# Patient Record
Sex: FEMALE | Race: BLACK OR AFRICAN AMERICAN
[De-identification: names, ages, dates, MRNs, and addresses within clinical notes are randomized per-mention and may not be internally consistent; named-entity substitution may affect disease eponyms.]

---

## 2019-10-26 ENCOUNTER — HOSPITAL ENCOUNTER (EMERGENCY)
Dept: HOSPITAL 76 - ED | Age: 24
LOS: 1 days | Discharge: HOME | End: 2019-10-27
Payer: COMMERCIAL
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
## 2019-10-26 DIAGNOSIS — S61.211A: Primary | ICD-10-CM
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
## 2019-10-26 PROCEDURE — 12001 RPR S/N/AX/GEN/TRNK 2.5CM/<: CPT

## 2019-10-26 PROCEDURE — 99281 EMR DPT VST MAYX REQ PHY/QHP: CPT

## 2019-10-26 NOTE — ED PHYSICIAN DOCUMENTATION
PD HPI UPPER EXT INJURY





- Stated complaint


Stated Complaint: FINGER LAC





- Chief complaint


Chief Complaint: Laceration





- History obtained from


History obtained from: Patient





- History of Present Illness


Location: Left, Finger


Type of injury: Laceration


Where injury occurred: Home


Timing - onset: Enter  time (22:15), Today


Timing - details: Abrupt onset


Improved by: Rest


Worsened by: Moving, Palpating


Associated symptoms: No: Weakness, Numbness, Tingling, Swelling, Discolored


Similar symptoms before: Has not had sx before


Recently seen: Not recently seen





- Additonal information


Additional information: 





patient was trying to open a bottle using a knife; knife slipped and caused 

laceration to her left 2nd finger. patient is right hand dominant





Review of Systems


Skin: reports: Laceration (s)


Neurologic: denies: Focal weakness, Numbness





PD PAST MEDICAL HISTORY





- Past Medical History


Past Medical History: No





- Present Medications


Home Medications: 


                                Ambulatory Orders











 Medication  Instructions  Recorded  Confirmed


 


No Known Home Medications  10/26/19 10/26/19














- Allergies


Allergies/Adverse Reactions: 


                                    Allergies











Allergy/AdvReac Type Severity Reaction Status Date / Time


 


No Known Drug Allergies Allergy   Verified 10/26/19 23:03














- Living Situation


Living Arrangement: reports: At home





PD ED PE NORMAL





- Vitals


Vital signs reviewed: Yes





- General


General: Alert and oriented X 3, No acute distress, Well developed/nourished





- Derm


Derm: Normal color, Warm and dry





- Extremities


Extremities: Normal ROM s pain, No edema





- Neuro


Neuro: No motor deficit (FROM and strength, flexion (including with isolation of

PIP, DIP, and MCP joints) and extension), No sensory deficit





PD ED PE EXPANDED





- Extremities


Extremities: Sensory intact (LTS intact distal to laceration), Vascular intact 

(brisk capillary refill at fingertip (left second finger))


RANDOLPH UE/Hands Visual: 


                            __________________________














                            __________________________





 1 - laceration (1 cm)








Results





- Vitals


Vitals: 


                               Vital Signs - 24 hr











  10/26/19 10/27/19





  23:00 00:20


 


Temperature 36.8 C 36.7 C


 


Heart Rate 75 74


 


Respiratory 16 18





Rate  


 


Blood Pressure 151/90 H 126/56 L


 


O2 Saturation 100 99








                                     Oxygen











O2 Source                      Room air

















Procedures





- Laceration (location)


  ** Finger left Ventral


Length in cm: 1


Wound type: Linear


Neurovascular status: Sensory intact, Motor intact, Vascular intact


Tendon involvement: Tendon intact


Anesthesia: Lidocaine 1%


Wound Preparation: Chlorhexadine, Irrigated copiously NS, Wound explored


Skin layer closure: Nylon, Running, Size #-0 - enter number (5-0)


Other: Patient tolerated well, No complications, Neurovascular intact, Tetanus 

UTD


Complexity: Simple





PD MEDICAL DECISION MAKING





- ED course


Complexity details: considered differential, d/w patient





Departure





- Departure


Disposition: 01 Home, Self Care


Clinical Impression: 


 Laceration





Condition: Good


Instructions:  ED Laceration Hand


Comments: 


Follow up with your primary care provider in 7-10 days for removal of the 

stitches


Discharge Date/Time: 10/27/19 00:20

## 2019-10-27 VITALS — DIASTOLIC BLOOD PRESSURE: 56 MMHG | SYSTOLIC BLOOD PRESSURE: 126 MMHG

## 2020-01-11 ENCOUNTER — HOSPITAL ENCOUNTER (EMERGENCY)
Dept: HOSPITAL 76 - ED | Age: 25
Discharge: HOME | End: 2020-01-11
Payer: COMMERCIAL

## 2020-01-11 VITALS — SYSTOLIC BLOOD PRESSURE: 145 MMHG | DIASTOLIC BLOOD PRESSURE: 87 MMHG

## 2020-01-11 DIAGNOSIS — R04.0: ICD-10-CM

## 2020-01-11 DIAGNOSIS — J32.9: ICD-10-CM

## 2020-01-11 DIAGNOSIS — H66.002: Primary | ICD-10-CM

## 2020-01-11 PROCEDURE — 99284 EMERGENCY DEPT VISIT MOD MDM: CPT

## 2020-01-11 PROCEDURE — 99282 EMERGENCY DEPT VISIT SF MDM: CPT

## 2020-01-11 NOTE — ED PHYSICIAN DOCUMENTATION
PD HPI URI





- Stated complaint


Stated Complaint: L EYE PX/SPITTING UP BLOOD





- Chief complaint


Chief Complaint: Heent





- History obtained from


History obtained from: Patient





- History of Present Illness


Timing - onset: Today


Timing duration: Hours


Timing details: Gradual onset, Still present


Associated symptoms: Nasal congestion, Sinus pain, Dry cough, Other (spitting up

blood)


Similar symptoms before: Has not had sx before


Recently seen: Not recently seen





- Additional information


Additional information: 





Previously well 24-year-old female felt that she had some pressure in the left 

side of her face and pain behind her left eye and she got into the shower to see

if it might help relieve the congestion.  When she started spitting up some 

blood she became concerned and has come to the emergency department.  She has 

had minimal cough with this she has not had fever.





Review of Systems


Constitutional: denies: Fever


Eyes: reports: Irritation, Other (pressure behind the left eye).  denies: 

Decreased vision


Ears: reports: Tinnitus/ringing.  denies: Loss of hearing, Ear pain


Nose: reports: Rhinorrhea / runny nose, Congestion, Sinus pressure / pain


Throat: denies: Sore throat


Cardiac: denies: Chest pain / pressure, Palpitations


Respiratory: reports: Cough.  denies: Dyspnea


GI: denies: Abdominal Pain, Nausea, Vomiting


: denies: Dysuria





PD PAST MEDICAL HISTORY





- Past Medical History


Past Medical History: No





- Past Surgical History


Past Surgical History: No





- Present Medications


Home Medications: 


                                Ambulatory Orders











 Medication  Instructions  Recorded  Confirmed


 


Azithromycin [Zithromax] 250 mg PO DAILY #6 tablet 01/11/20 














- Allergies


Allergies/Adverse Reactions: 


                                    Allergies











Allergy/AdvReac Type Severity Reaction Status Date / Time


 


No Known Drug Allergies Allergy   Verified 10/26/19 23:03














- Social History


Does the pt smoke?: No


Smoking Status: Never smoker


Does the pt drink ETOH?: Yes


Does the pt have substance abuse?: No





- Immunizations


Immunizations are current?: Yes





- POLST


Patient has POLST: No





PD ED PE NORMAL





- Vitals


Vital signs reviewed: Yes (hypertension )





- General


General: No acute distress, Well developed/nourished





- HEENT


HEENT: Atraumatic, PERRL, EOMI, Other (The left TM is mildly erythematous along 

the umbo with rounding of the umbo and the right is clear.  There is some 

injection to the left sclera.  There is no sinus point tenderness on the left 

side.  To the maxillary or frontal sinus.  She does have increased pain with 

stooping.  She has blood coming from the left nares from the anterior septum.)





- Neck


Neck: Supple, no meningeal sign, No bony TTP





- Cardiac


Cardiac: RRR, No murmur





- Respiratory


Respiratory: No respiratory distress, Clear bilaterally





- Abdomen


Abdomen: Soft, Non tender





- Derm


Derm: Normal color, Warm and dry, No rash





- Extremities


Extremities: No deformity, No edema, No calf tenderness / cord





- Neuro


Neuro: CNs 2-12 intact, No motor deficit, No sensory deficit, Normal speech


Eye Opening: Spontaneous


Motor: Obeys Commands


Verbal: Oriented


GCS Score: 15





- Psych


Psych: Normal mood, Normal affect





Results





- Vitals


Vitals: 





                               Vital Signs - 24 hr











  01/11/20





  13:15


 


Temperature 36.9 C


 


Heart Rate 81


 


Respiratory 16





Rate 


 


Blood Pressure 145/87 H


 


O2 Saturation 100








                                     Oxygen











O2 Source                      Room air

















PD MEDICAL DECISION MAKING





- ED course


Complexity details: considered differential, d/w patient


ED course: 





24-year-old female spitting up blood with sinus congestion and inflammation has 

otitis on the left and some sinusitis.  She is administered dexamethasone 4 mg 

orally we will place her on some azithromycin.I have given the patient a nasal 

clamp should she have issues with bleeding.





Departure





- Departure


Disposition: 01 Home, Self Care


Clinical Impression: 


Otitis media


Qualifiers:


 Otitis media type: suppurative Chronicity: acute Laterality: left Recurrence: 

non-recurrent Spontaneous tympanic membrane rupture: without spontaneous rupture

 Qualified Code(s): H66.002 - Acute suppurative otitis media without spontaneous

 rupture of ear drum, left ear





Condition: Stable


Instructions:  ED Otitis Media Acute Adult


Follow-Up: 


[facility] [Provider Group]


Prescriptions: 


Azithromycin [Zithromax] 250 mg PO DAILY #6 tablet

## 2020-10-31 ENCOUNTER — HOSPITAL ENCOUNTER (EMERGENCY)
Dept: HOSPITAL 76 - ED | Age: 25
Discharge: HOME | End: 2020-10-31
Payer: COMMERCIAL

## 2020-10-31 VITALS — DIASTOLIC BLOOD PRESSURE: 82 MMHG | SYSTOLIC BLOOD PRESSURE: 139 MMHG

## 2020-10-31 DIAGNOSIS — M54.6: ICD-10-CM

## 2020-10-31 DIAGNOSIS — M62.830: Primary | ICD-10-CM

## 2020-10-31 PROCEDURE — 99283 EMERGENCY DEPT VISIT LOW MDM: CPT

## 2020-10-31 PROCEDURE — 99284 EMERGENCY DEPT VISIT MOD MDM: CPT
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
